# Patient Record
(demographics unavailable — no encounter records)

---

## 2024-10-17 NOTE — CONSULT LETTER
[Dear  ___] : Dear  [unfilled], [Consult Letter:] : I had the pleasure of evaluating your patient, [unfilled]. [Please see my note below.] : Please see my note below. [Consult Closing:] : Thank you very much for allowing me to participate in the care of this patient.  If you have any questions, please do not hesitate to contact me. [Sincerely,] : Sincerely, [FreeTextEntry2] : Cj Brady MD [address] [place] [address] Phone: [phone] [FreeTextEntry3] : Petar Rodríguez MD Division of Pediatric, General, Thoracic and Endoscopic Surgery [facility]

## 2024-10-17 NOTE — HISTORY OF PRESENT ILLNESS
[FreeTextEntry1] : Mason is a 16 year old boy here today for follow up for a left hip soft tissue mass. Since his last visit in December 2023, Mason has been doing well and denies any associated pain or discomfort. He denies any changes in size of the lesion or to the surrounding skin. He has not had any unexplained fevers or changes in energy levels. He continues to ambulate well and play sports without any discomfort at the site.  He had a follow up ultrasound today and they are here to discuss the results.

## 2024-10-17 NOTE — ASSESSMENT
[FreeTextEntry1] : Mason is a 16 year old boy with a soft tissue mass, likely venous malformation, of the left hip region. He is doing well and remains asymptomatic at this time.  His physical exam remains stable.  He had an ultrasound today that I reviewed with Dr Mclaughlin of pediatric radiology and then with Mason and dad.  It redemonstrated an ovoid hypoechoic lesion measuring 2.1 x 1.6x 0.2 cm, previously 2.1 x 0.3 x 2 cm in the subcutaneous soft tissues of the left hip There is internal flow with color Doppler imaging which appears to be venous with spectral Doppler evaluation.  Overall, it is reported as an unchanged vascular lesion of the left hip which is characteristic of a venous malformation I offered reassurance given these findings.  I reviewed treatment options at this time including ongoing observation versus IR treatment with sclerotherapy versus surgical resection.  They had met with Dr Robertson last year to review the possible IR options.  Given the lesion remains stable and he remains asymptomatic, they would like to hold off on any intervention at this time and continue with surveillance. We will obtain a follow up ultrasound in approximately one year.  They will follow up with me after the ultrasound to review the results and determine next steps.  I have encouraged Mason to monitor the site and they know to contact me sooner with any changes at the site or should he develop any new symptoms at the site.  They know to contact me as well with any further questions or concerns.

## 2024-10-17 NOTE — REASON FOR VISIT
[Follow-up - Scheduled] : a follow-up, scheduled visit for [Soft tissue mass] : soft tissue mass [Patient] : patient [Father] : father [FreeTextEntry4] : Dr Cj Brady

## 2024-10-17 NOTE — ADDENDUM
[FreeTextEntry1] : Documented by Murray Monaco acting as a scribe for Dr. Rodríguez on 10/14/2024.   All medical record entries made by the Scribe were at my, Dr. Rodríguez, direction and personally dictated by me on 10/14/2024. I have reviewed the chart and agree that the record accurately reflects my personal performances of the history, physical exam, assessment and plan. I have also personally directed, reviewed, and agree with the instructions.

## 2024-10-17 NOTE — DATA REVIEWED
[de-identified] : ACC: [id number]     EXAM:  US PELVIC COMPLETE   ORDERED BY: JERICHO PIERCE  PROCEDURE DATE:  10/14/2024    INTERPRETATION:  EXAMINATION: US PELVIS COMPLETE  CLINICAL INFORMATION: mass of joint lt hip  TECHNIQUE: Real-time ultrasound of the was performed using a linear transducer and color Doppler interrogation dated 10/14/2024 8:21 AM  COMPARISON: None  FINDINGS: Redemonstrated is an ovoid hypoechoic lesion measuring 2.1 x 1.6x 0.2 cm, previously 2.1 x 0.3 x 2 cm. In the subcutaneous soft tissues of the left hip There is internal flow with color Doppler imaging which appears to be venous with spectral Doppler evaluation.  IMPRESSION: Overall unchanged vascular lesion of the left hip which is characteristic of a venous malformation  --- End of Report ---      NICKY TOUSSAINT MD; Attending Radiologist This document has been electronically signed. Oct 14 2024  8:40AM

## 2024-10-17 NOTE — CONSULT LETTER
[Dear  ___] : Dear  [unfilled], [Consult Letter:] : I had the pleasure of evaluating your patient, [unfilled]. [Please see my note below.] : Please see my note below. [Consult Closing:] : Thank you very much for allowing me to participate in the care of this patient.  If you have any questions, please do not hesitate to contact me. [Sincerely,] : Sincerely, [FreeTextEntry2] : Cj Brady MD [address] [place] [address] Phone: [phone] [FreeTextEntry3] : Petar Rodríguez MD Division of Pediatric, General, Thoracic and Endoscopic Surgery [facility]

## 2024-10-17 NOTE — DATA REVIEWED
[de-identified] : ACC: [id number]     EXAM:  US PELVIC COMPLETE   ORDERED BY: JERICHO PIERCE  PROCEDURE DATE:  10/14/2024    INTERPRETATION:  EXAMINATION: US PELVIS COMPLETE  CLINICAL INFORMATION: mass of joint lt hip  TECHNIQUE: Real-time ultrasound of the was performed using a linear transducer and color Doppler interrogation dated 10/14/2024 8:21 AM  COMPARISON: None  FINDINGS: Redemonstrated is an ovoid hypoechoic lesion measuring 2.1 x 1.6x 0.2 cm, previously 2.1 x 0.3 x 2 cm. In the subcutaneous soft tissues of the left hip There is internal flow with color Doppler imaging which appears to be venous with spectral Doppler evaluation.  IMPRESSION: Overall unchanged vascular lesion of the left hip which is characteristic of a venous malformation  --- End of Report ---      NICKY TOUSSAINT MD; Attending Radiologist This document has been electronically signed. Oct 14 2024  8:40AM

## 2024-10-17 NOTE — PHYSICAL EXAM
[NL] : grossly intact [TextBox_73] : left hip with `2.5 well circumscribed palpable mass, nontender,stable from prior exam